# Patient Record
Sex: MALE | Race: BLACK OR AFRICAN AMERICAN
[De-identification: names, ages, dates, MRNs, and addresses within clinical notes are randomized per-mention and may not be internally consistent; named-entity substitution may affect disease eponyms.]

---

## 2022-05-09 PROBLEM — Z00.00 ENCOUNTER FOR PREVENTIVE HEALTH EXAMINATION: Status: ACTIVE | Noted: 2022-05-09

## 2022-05-10 ENCOUNTER — NON-APPOINTMENT (OUTPATIENT)
Age: 47
End: 2022-05-10

## 2022-05-10 ENCOUNTER — APPOINTMENT (OUTPATIENT)
Dept: CARDIOLOGY | Facility: CLINIC | Age: 47
End: 2022-05-10
Payer: MEDICAID

## 2022-05-10 VITALS
OXYGEN SATURATION: 97 % | WEIGHT: 259 LBS | DIASTOLIC BLOOD PRESSURE: 82 MMHG | TEMPERATURE: 97.5 F | HEART RATE: 92 BPM | HEIGHT: 69 IN | BODY MASS INDEX: 38.36 KG/M2 | SYSTOLIC BLOOD PRESSURE: 160 MMHG

## 2022-05-10 DIAGNOSIS — G47.33 OBSTRUCTIVE SLEEP APNEA (ADULT) (PEDIATRIC): ICD-10-CM

## 2022-05-10 DIAGNOSIS — R06.00 DYSPNEA, UNSPECIFIED: ICD-10-CM

## 2022-05-10 DIAGNOSIS — I10 ESSENTIAL (PRIMARY) HYPERTENSION: ICD-10-CM

## 2022-05-10 PROCEDURE — 99204 OFFICE O/P NEW MOD 45 MIN: CPT | Mod: 25

## 2022-05-10 PROCEDURE — 93000 ELECTROCARDIOGRAM COMPLETE: CPT

## 2022-05-10 RX ORDER — AMLODIPINE BESYLATE 10 MG/1
10 TABLET ORAL
Qty: 90 | Refills: 3 | Status: ACTIVE | COMMUNITY
Start: 2022-05-10 | End: 1900-01-01

## 2022-05-10 RX ORDER — HYDROCHLOROTHIAZIDE 12.5 MG/1
12.5 CAPSULE ORAL DAILY
Refills: 0 | Status: DISCONTINUED | COMMUNITY
End: 2022-05-10

## 2022-05-10 NOTE — DISCUSSION/SUMMARY
[___ Week(s)] : in [unfilled] week(s) [FreeTextEntry1] : No evidence of any active cardiac issues.  Patient likely has significant physical deconditioning and would benefit from full pulmonary function testing to rule out any occult pulmonary disease.  He states that the symptoms started soon after his "COVID infection "in 2019 (although he admits he was never diagnosed as such in 2019 but states he did have diagnosed COVID in April, 2020 in February, 2022).  Patient not vaccinated (he was told by the hospital he did need to get vaccinated), I suggested that he should reconsider this.\par Elevated blood pressures noted due to noncompliance with medical regimen, refilled his Norvasc and hydrochlorothiazide.  Patient to continue self monitoring blood pressures at home and bring me a log of his blood pressures at his next visit in 6 to 8 weeks.  Patient was told that he needs to find a primary care physician for managing his noncardiac issues.\par \par Patient trying severe calorie restriction (he states he eats 600 to 800 hung daily), I told him he would also benefit from a graded exercise program as well.

## 2022-05-10 NOTE — HISTORY OF PRESENT ILLNESS
[FreeTextEntry1] : 46-year-old male recently seen at [facility] in March, 2022 for complaints of chronic chest discomfort.  As per the patient he has had recurrence of COVID infections starting in 2019.  Until then he worked as an [employer]  but since then he has had progressively worsening dyspnea on exertion and chest discomfort.  During his hospital course he was diagnosed with essential hypertension.  Started on Norvasc and hydrochlorothiazide which she ran out of and has been off medications for the past several weeks.  At the hospital he had a CTA of chest which showed no evidence of pulmonary embolism he had scattered areas of atelectasis but no evidence of effusion or pneumothorax.  No evidence of COVID-19 or pneumonia were seen.  Cardiovascular stress imaging done showed a moderately sized region of medium intensity reversible perfusion in the inferoapical and posterior oral inferobasal region suggestive of myocardial ischemia.  Patient was subsequently given a cardiac catheterization which demonstrated no significant coronary obstruction and normal left ventricular function.  Patient was told to follow-up with cardiology which he did not do and has been trying to follow-up with medicine since then as well.\par \par 's complaints currently are mostly dyspnea on exertion.  He states that when he walks very little he gets very winded.  He admits to being overweight but states he is lost 11 pounds over the past several months by concerted dieting.  He does not exercise actively.  He denies any use of tobacco, IV drugs or alcohol.  Single with 1 child.  Currently unemployed.  Admits to snoring at night.  Never had a sleep study.

## 2022-05-10 NOTE — CARDIOLOGY SUMMARY
[de-identified] : 5/10/2022, Sinus  Rhythm \par -RSR(V1) -nondiagnostic. \par  -Nonspecific ST depression  -Nondiagnostic.  [de-identified] : Result Date: 3/3/2022\par Facility: [facility] Nuclear cardiac stress test Clinical information: 46-year-old male with chest pain TECHNIQUE: 30 minutes after the intravenous administration of 10.7 mCi of technetium 99m-Cardiolite, resting SPECT images of the heart were obtained. Immediately after the IV administration of 0.4 mg of Lexiscan, 31.2 mCi of technetium 99m-Cardiolite was administered and SPECT images were obtained 30 minutes later Refer to separate cardiology report regarding EKG changes and clinical observations during the clinical portion of the stress test. COMPARISON STUDY: None FINDINGS: Scintigraphy: A moderate sized region of medium intensity reversible perfusion is noted in the inferoapical region and posterior inferobasal region, findings indicating myocardial ischemia. There are no fixed perfusion defects noted to indicate myocardial scarring. Wall motion analysis: Left ventricle is mildly dilated. Normal segmental wall motion was noted. Left ventricular ejection fraction: Within normal limits, 58%   End diastolic volume was 91 mL and end systolic volume was 39 mL IMPRESSION: Moderate  myocardial ischemia noted in the inferoapical region and posterior inferobasal region. Mild left ventricular dilatation. Normal segmental wall motion noted. Normal left ventricular ejection fraction, 58% Electronically signed by:  Paulino Pennington MD  3/3/2022 12:53 PM EST Workstation: [id number]\par   [de-identified] : CV Echo 2D Complete\par  \par Result Date: 3/3/2022\par [address], [place] [address] [address] Phone [phone] Transthoracic Echocardiogram PATIENT: Dell Merchant              ACCESSION#:                  [id number] MRN:     [id number]                    STUDY DATE:                  2022 :     1975                  PATIENT STATUS:  Referring Physician: Karissa Vivas Reading Physician: Vivian Dewey Sonographer: Libby Perry  ---------------------------------------------------------------------------------------------------------------------------------- Indications:   Shortness of breath (SOB) (R06.02). ---------------------------------------------------------------------------------------------------------------------------------- CPT Codes:  2D ECHO (TTE) - 28165 ---------------------------------------------------------------------------------------------------------------------------------- Demographics:  Patient is 46 yr old. Patient YOB: 1975. Birth gender: male. Height: 152.4 cm. Height: 60 in. Weight: 111.1 kg. Weight: 244.5 lb. BMI: 47.8 kg/m^2. BSA: 2.03 m^2. Heart rate: 62 bpm. BP: 98/57 ---------------------------------------------------------------------------------------------------------------------------------- Study data:  Transthoracic echocardiogram.  Complete 2D, complete spectral Doppler, and color Doppler techniques were used. ---------------------------------------------------------------------------------------------------------------------------------- Procedure:   Image quality for TTE was adequate. ---------------------------------------------------------------------------------------------------------------------------------- Conclusions (see detailed report below) Summary: 1. Left ventricle: The cavity size is normal. The left ventricular ejection    fraction is normal. Left ventricular ejection fraction is 60-65%. The    left ventricular filling pattern is pseudonormal. Diastolic dysfunction    is present. 2. Right ventricle: The cavity size is normal. Right ventricular systolic    function is normal. 3. Mitral valve: The leaflets are normal thickness. There is trace mitral    valve regurgitation. 4. Aortic valve: The valve is trileaflet. There is no valvular aortic    regurgitation. 5. Tricuspid valve: The tricuspid leaflets are not thickened. There is mild    (1+) tricuspid valve regurgitation. 6. Pericardium, extracardiac: There is no pericardial effusion. Impressions:  Visual ejection fraction 60-65%. Mild Left ventricle basal septal hypertrophy Possible stage 2 diastolic dysfunction with pseudonomral filling. Mild tricuspid regurgitation Trace mitral regurgitation and pulmonary regurgitation ---------------------------------------------------------------------------------------------------------------------------------- Findings Left ventricle:  The cavity size is normal.  The left ventricular ejection fraction is normal. Left ventricular ejection fraction is 60-65%. Mild Left ventricle basal septal hypertrophy. The left ventricular filling pattern is pseudonormal. Diastolic dysfunction is present. Left atrium:  The cavity size is mildly dilated. Right ventricle:  The cavity size is normal. Right ventricular systolic function is normal. Right atrium:  The cavity size is normal in size. Aortic valve:   The valve is trileaflet.    There is no valvular aortic regurgitation. Mitral valve:  The leaflets are normal thickness.  There is trace mitral valve regurgitation. Tricuspid valve:   The tricuspid leaflets are not thickened.    There is mild (1+) tricuspid valve regurgitation. Pulmonic valve:    There is trace pulmonic regurgitation. Aorta:  The aortic root size is normal. The ascending aorta size is normal. Systemic veins:  The inferior vena cava is normal in size. Pericardium:  There is no pericardial effusion. ---------------------------------------------------------------------------------------------------------------------------------- Measurements  Left ventricle            Value        Ref        Left atrium continued         Value        Ref  KYMBERLY                 (L)   4.0   cm     4.2 - 5.8  Area ES, A4C         (H)      21.8  cm^2   <=20.0  ESD                       2.9   cm     2.5 - 4.0  Vol, ES, Biplane              74    ml     -----------  IVS, ED             (H)   1.8   cm     0.6 - 1.0  Vol/bsa, ES, Biplane (H)      36.3  ml/m^2 16.0 - 34.0  PW, ED              (H)   1.5   cm     0.6 - 1.0  EDV                       71    ml     62 - 150   Aortic valve                  Value        Ref  ESV                       32    ml     21 - 61    Peak v, S                     105   cm/sec -----------  EDV, 2-p                  113   ml     62 - 150   Mean v, S                     76.4  cm/sec -----------  ESV, 2-p                  44    ml     21 - 61    VTI, S                        21.8  cm     -----------  EF, 2-p                   61    %      52 - 72    Peak grad, S                  4.0   mm Hg  -----------  IVRT                      90    ms     ---------  Mean grad, S                  3.0   mm Hg  -----------  E', lat kaci, TDI          12.0  cm/sec >=10.0     LVOT/AV, VTI ratio            0.85         -----------  E/e', lat kaci, TDI        6            ---------  PRIMITIVO, VTI                      2.42  cm^2   -----------  A', lat kaci, TDI          10.7  cm/sec ---------  LVOT/AV, Vpeak ratio          0.87         -----------  E'/a', lat kaci, TDI       1.12         ---------  PRIMITIVO, Vmax                     2.48  cm^2   -----------  E', med kaci, TDI          7.9   cm/sec >=7.0  E/e', med kaci, TDI        10           ---------  Mitral valve                  Value        Ref  A', med kaci, TDI          10.1  cm/sec ---------  Peak E                        76    cm/sec -----------  E'/a', med kaci, TDI       0.79         ---------  Peak A                        59.2  cm/sec -----------  E/e', avg, TDI            8            <=14       Decel time                    175   ms     -----------                                                    PHT                           51    ms     -----------  LVOT                      Value        Ref        Mean grad, D                  1.0   mm Hg  -----------  Diam, S                   1.9   cm     ---------  Peak grad, D                  2.0   mm Hg  -----------  Area                      2.84  cm^2   ---------  Max MR van                    406   cm/sec -----------  Peak van, S               91.8  cm/sec ---------  VTI, S                    18.6  cm     ---------  Pulmonic valve                Value        Ref  SV                        53    ml     ---------  Mean grad, S                  1.0   mm Hg  -----------  SV/bsa                    26.11 ml/m^2 ---------                                                    Tricuspid valve               Value        Ref  Right ventricle           Value        Ref        TR peak v                     235   cm/sec <=280  TAPSE, 2D                 1.8   cm     1.7 - 3.1  Peak RV-RA grad, S            27    mm Hg  -----------   Left atrium               Value        Ref        Aortic root                   Value        Ref  LA ID                     4.0   cm     ---------  Root diam ID                  3.3   cm     <4.2 Legend: (L)  and  (H)  maya values outside specified reference range. Prepared and electronically signed by Vivian Dewey 2022 16:03  [de-identified] : 3/4/2022, normal LV gram, elevated LVEDP equals 25 mmHg, 50% eccentric ostial lesion in OM1, otherwise mild diffuse atherosclerotic coronary disease.

## 2022-08-05 ENCOUNTER — RX RENEWAL (OUTPATIENT)
Age: 47
End: 2022-08-05

## 2022-08-05 RX ORDER — HYDROCHLOROTHIAZIDE 25 MG/1
25 TABLET ORAL DAILY
Qty: 90 | Refills: 2 | Status: ACTIVE | COMMUNITY
Start: 2022-05-10 | End: 1900-01-01